# Patient Record
Sex: FEMALE | Race: WHITE | URBAN - METROPOLITAN AREA
[De-identification: names, ages, dates, MRNs, and addresses within clinical notes are randomized per-mention and may not be internally consistent; named-entity substitution may affect disease eponyms.]

---

## 2017-04-04 ENCOUNTER — TELEPHONE (OUTPATIENT)
Dept: FAMILY MEDICINE CLINIC | Facility: CLINIC | Age: 22
End: 2017-04-04

## 2017-04-04 NOTE — TELEPHONE ENCOUNTER
Patient states she is studying abroad and needed to know her blood type but i informed patient the only way we would know what type of blood type is if she has ever had a baby or had a recent blood transfusion. Patient denies both.  I informed patient the e

## 2017-06-27 ENCOUNTER — TELEPHONE (OUTPATIENT)
Dept: FAMILY MEDICINE CLINIC | Facility: CLINIC | Age: 22
End: 2017-06-27

## 2017-06-27 NOTE — TELEPHONE ENCOUNTER
Spoke with pt and she needs to come in for Meningitis vaccination before school starts. Last time she was in for a visit was 8/15/16 with Anusha Zamora.   Pt states she had a px done at [facility] by [facility] in April for her study abroad this spring- does she need to c

## 2017-06-27 NOTE — TELEPHONE ENCOUNTER
Pt advised- coming in on Monday for vaccination.     Future Appointments  Date Time Provider Annamarie Parrish   7/3/2017 10:15 AM [facility] NURSE [facility] [place]

## 2017-07-06 ENCOUNTER — NURSE ONLY (OUTPATIENT)
Dept: FAMILY MEDICINE CLINIC | Facility: CLINIC | Age: 22
End: 2017-07-06

## 2017-07-06 PROCEDURE — 90734 MENACWYD/MENACWYCRM VACC IM: CPT | Performed by: FAMILY MEDICINE

## 2017-07-06 PROCEDURE — 90471 IMMUNIZATION ADMIN: CPT | Performed by: FAMILY MEDICINE

## 2018-05-01 ENCOUNTER — OFFICE VISIT (OUTPATIENT)
Dept: FAMILY MEDICINE CLINIC | Facility: CLINIC | Age: 23
End: 2018-05-01

## 2018-05-01 VITALS
TEMPERATURE: 99 F | HEART RATE: 80 BPM | BODY MASS INDEX: 25.9 KG/M2 | DIASTOLIC BLOOD PRESSURE: 76 MMHG | SYSTOLIC BLOOD PRESSURE: 104 MMHG | RESPIRATION RATE: 14 BRPM | WEIGHT: 165 LBS | HEIGHT: 67 IN

## 2018-05-01 DIAGNOSIS — Z00.00 HEALTHCARE MAINTENANCE: Primary | ICD-10-CM

## 2018-05-01 PROCEDURE — 99395 PREV VISIT EST AGE 18-39: CPT | Performed by: NURSE PRACTITIONER

## 2018-05-01 PROCEDURE — 86580 TB INTRADERMAL TEST: CPT | Performed by: NURSE PRACTITIONER

## 2018-05-01 RX ORDER — NORETHINDRONE ACETATE AND ETHINYL ESTRADIOL AND FERROUS FUMARATE 1MG-20(21)
1 KIT ORAL DAILY
Refills: 3 | COMMUNITY
Start: 2018-04-07

## 2018-05-01 NOTE — PATIENT INSTRUCTIONS
PPD today. Return in 48 - 72 hours for reading. Physical form completed. Bring in information from gyne. Follow-up as needed.

## 2018-05-01 NOTE — PROGRESS NOTES
HPI:    Patient ID: Fatuma Styles is a 25year old female. HPI    Patient is present for a preemployment physical.  She needs a  form completed.   She also states she needs a current a TB skin test.  She declined doing the QuantiFERON test, stati She has a normal mood and affect. Her behavior is normal. Judgment and thought content normal.   Nursing note and vitals reviewed.        05/01/18  1434   BP: 104/76   Pulse: 80   Resp: 14   Temp: 98.5 °F (36.9 °C)   TempSrc: Tympanic   Weight: 165 lb   Hei

## 2018-05-03 ENCOUNTER — NURSE ONLY (OUTPATIENT)
Dept: FAMILY MEDICINE CLINIC | Facility: CLINIC | Age: 23
End: 2018-05-03

## 2018-05-03 NOTE — PROGRESS NOTES
PPD placed on 5/1/18  Read today as negative  Form completed for patient  Copy made to send to scanning    Shawna Schilling, 05/03/18, 3:40 PM

## 2018-05-17 ENCOUNTER — OFFICE VISIT (OUTPATIENT)
Dept: FAMILY MEDICINE CLINIC | Facility: CLINIC | Age: 23
End: 2018-05-17

## 2018-05-17 VITALS
HEART RATE: 84 BPM | RESPIRATION RATE: 14 BRPM | BODY MASS INDEX: 24.39 KG/M2 | TEMPERATURE: 97 F | HEIGHT: 67 IN | WEIGHT: 155.38 LBS | SYSTOLIC BLOOD PRESSURE: 118 MMHG | DIASTOLIC BLOOD PRESSURE: 60 MMHG

## 2018-05-17 DIAGNOSIS — K52.9 GASTROENTERITIS: Primary | ICD-10-CM

## 2018-05-17 PROCEDURE — 99214 OFFICE O/P EST MOD 30 MIN: CPT | Performed by: NURSE PRACTITIONER

## 2018-05-17 NOTE — PROGRESS NOTES
Mauro Subramanian is a 25year old female. Patient presents with:  Flu: friday/saturday vomiting, turned into diarrhea since saturday      HPI:   Complaints of Friday ate Chipole- ate carnitas, rice, anthony, chips, Guacamole, cheese.   7-8pm and by 12am - vomit arm, Patient Position: Sitting, Cuff Size: adult)   Pulse 84   Temp 97.3 °F (36.3 °C) (Temporal)   Resp 14   Ht 67\"   Wt 155 lb 6.4 oz   LMP 05/09/2018   BMI 24.34 kg/m²   GENERAL: well developed, well nourished,in no apparent distress  SKIN: no rashes,no

## 2018-06-04 ENCOUNTER — OFFICE VISIT (OUTPATIENT)
Dept: FAMILY MEDICINE CLINIC | Facility: CLINIC | Age: 23
End: 2018-06-04

## 2018-06-04 VITALS
RESPIRATION RATE: 16 BRPM | HEART RATE: 120 BPM | DIASTOLIC BLOOD PRESSURE: 82 MMHG | SYSTOLIC BLOOD PRESSURE: 120 MMHG | TEMPERATURE: 101 F | BODY MASS INDEX: 24.58 KG/M2 | WEIGHT: 156.63 LBS | HEIGHT: 67 IN

## 2018-06-04 DIAGNOSIS — J02.9 SORE THROAT: ICD-10-CM

## 2018-06-04 DIAGNOSIS — J01.00 ACUTE NON-RECURRENT MAXILLARY SINUSITIS: Primary | ICD-10-CM

## 2018-06-04 PROCEDURE — 99214 OFFICE O/P EST MOD 30 MIN: CPT | Performed by: NURSE PRACTITIONER

## 2018-06-04 PROCEDURE — 87880 STREP A ASSAY W/OPTIC: CPT | Performed by: NURSE PRACTITIONER

## 2018-06-04 PROCEDURE — 87081 CULTURE SCREEN ONLY: CPT | Performed by: NURSE PRACTITIONER

## 2018-06-04 RX ORDER — AMOXICILLIN AND CLAVULANATE POTASSIUM 875; 125 MG/1; MG/1
1 TABLET, FILM COATED ORAL 2 TIMES DAILY
Qty: 20 TABLET | Refills: 0 | Status: SHIPPED | OUTPATIENT
Start: 2018-06-04 | End: 2018-06-14

## 2018-06-04 NOTE — PROGRESS NOTES
CHIEF COMPLAINT:   Patient presents with:  Sore Throat: Swollen Glands  Fatigue      HPI:   Fatuma Styles is a 25year old female who presents to clinic today with complaints of sore throat and fever   Started with sore throat  on Thursday (5/31)- felicity Erythematous-large amount of thick, yellow, postnasal drainage noted. NECK: supple, non-tender  THYROID: Normal size, no nodules  LUNGS: clear to auscultation bilaterally, no wheezes or rhonchi. Breathing is non labored.   CARDIO: RRR without murmur  ABDOM

## 2018-06-04 NOTE — PATIENT INSTRUCTIONS
Rest, increase fluids, salt water gargles ,neti pot (use distilled water) or saline nasal spray, Advil cold and sinus (behind the counter),Tylenol, follow up if symptoms persist or increase.

## 2018-06-06 ENCOUNTER — TELEPHONE (OUTPATIENT)
Dept: FAMILY MEDICINE CLINIC | Facility: CLINIC | Age: 23
End: 2018-06-06

## 2018-06-07 NOTE — TELEPHONE ENCOUNTER
----- Message from JOEL Temple sent at 6/6/2018  5:19 PM CDT -----  Please notify patient that her strep culture came back positive for strep–she is on Augmentin–this should take care of the infection–patient should finish prescription.   Follow-up

## 2018-06-25 ENCOUNTER — OFFICE VISIT (OUTPATIENT)
Dept: FAMILY MEDICINE CLINIC | Facility: CLINIC | Age: 23
End: 2018-06-25

## 2018-06-25 VITALS
HEART RATE: 106 BPM | OXYGEN SATURATION: 98 % | TEMPERATURE: 98 F | DIASTOLIC BLOOD PRESSURE: 60 MMHG | RESPIRATION RATE: 14 BRPM | BODY MASS INDEX: 24.14 KG/M2 | HEIGHT: 67 IN | WEIGHT: 153.81 LBS | SYSTOLIC BLOOD PRESSURE: 104 MMHG

## 2018-06-25 DIAGNOSIS — J03.90 TONSILLITIS: Primary | ICD-10-CM

## 2018-06-25 DIAGNOSIS — J02.9 SORE THROAT: ICD-10-CM

## 2018-06-25 DIAGNOSIS — I88.9 SUBMANDIBULAR LYMPHADENITIS: ICD-10-CM

## 2018-06-25 PROCEDURE — 87081 CULTURE SCREEN ONLY: CPT | Performed by: FAMILY MEDICINE

## 2018-06-25 PROCEDURE — 99214 OFFICE O/P EST MOD 30 MIN: CPT | Performed by: FAMILY MEDICINE

## 2018-06-25 PROCEDURE — 87880 STREP A ASSAY W/OPTIC: CPT | Performed by: FAMILY MEDICINE

## 2018-06-25 RX ORDER — SULFAMETHOXAZOLE AND TRIMETHOPRIM 800; 160 MG/1; MG/1
1 TABLET ORAL 2 TIMES DAILY
Qty: 20 TABLET | Refills: 0 | Status: SHIPPED | OUTPATIENT
Start: 2018-06-25 | End: 2018-07-05

## 2018-07-12 ENCOUNTER — OFFICE VISIT (OUTPATIENT)
Dept: FAMILY MEDICINE CLINIC | Facility: CLINIC | Age: 23
End: 2018-07-12

## 2018-07-12 VITALS
RESPIRATION RATE: 14 BRPM | HEART RATE: 62 BPM | WEIGHT: 155 LBS | OXYGEN SATURATION: 99 % | SYSTOLIC BLOOD PRESSURE: 110 MMHG | TEMPERATURE: 97 F | BODY MASS INDEX: 24.91 KG/M2 | HEIGHT: 66 IN | DIASTOLIC BLOOD PRESSURE: 80 MMHG

## 2018-07-12 DIAGNOSIS — I88.9 ADENITIS: Primary | ICD-10-CM

## 2018-07-12 DIAGNOSIS — J03.90 TONSILLITIS: ICD-10-CM

## 2018-07-12 PROCEDURE — 99213 OFFICE O/P EST LOW 20 MIN: CPT | Performed by: FAMILY MEDICINE

## 2018-07-12 NOTE — PROGRESS NOTES
[address]  PROGRESS NOTE  Chief Complaint:   Patient presents with:   Follow - Up: From /swelling in throat area      HPI:   This is a 25year old female coming in for recent bout of tonsillitis with associated swollen lymph glands Comment:Other reaction(s): hives/rash  Suprax                      Comment:Other reaction(s): hives, rash  Current Meds:    Current Outpatient Prescriptions:  JUNEL FE 1/20 1-20 MG-MCG Oral Tab Take 1 tablet by mouth daily.    Disp:  Rfl: 3      Co reviewed. Appears stated age, well groomed. Physical Exam:  GEN:  Patient is alert, awake and oriented, well developed, well nourished, no apparent distress.   HEENT:  Head:  Normocephalic, atraumatic Eyes: EOMI, PERRLA, no scleral icterus, conjunctivae kelly output off infections.   Health Maintenance:  Hepatitis A Vaccines(1 of 2 - Standard Series) due on 08/09/1997  HPV Vaccines(1 of 3 - Female 3 Dose Series) due on 08/09/2007  Chlamydia Screening due on 08/09/2012  Pap Smear,3 Years due on 08/09/2017    [facility]

## 2018-07-23 ENCOUNTER — TELEPHONE (OUTPATIENT)
Dept: FAMILY MEDICINE CLINIC | Facility: CLINIC | Age: 23
End: 2018-07-23

## 2018-07-23 DIAGNOSIS — J03.91 RECURRENT TONSILLITIS: Primary | ICD-10-CM

## 2018-07-23 NOTE — TELEPHONE ENCOUNTER
Spoke with pt's mother Christian Flores who called. Mother states that pt would like a referral to a surgeon to have her tonsils removed. Please advise.

## 2018-07-23 NOTE — TELEPHONE ENCOUNTER
Referral to Dr Tyler Barrios, ENT,usually need 6 infections / year or 3 infections 2 years in a row for consideration, however due to being noisy sleeper may qualify, recommend eval.

## 2018-07-24 NOTE — TELEPHONE ENCOUNTER
Pt returned call and was notified and verbalized understanding regarding ENT referral. She was given information regarding location and number to call and schedule appt.      I called Dr. Abhinav Villaseñor office to obtain fax number and was asked to send office vis

## 2018-07-30 ENCOUNTER — OFFICE VISIT (OUTPATIENT)
Dept: FAMILY MEDICINE CLINIC | Facility: CLINIC | Age: 23
End: 2018-07-30
Payer: COMMERCIAL

## 2018-07-30 ENCOUNTER — TELEPHONE (OUTPATIENT)
Dept: FAMILY MEDICINE CLINIC | Facility: CLINIC | Age: 23
End: 2018-07-30

## 2018-07-30 VITALS
WEIGHT: 154 LBS | TEMPERATURE: 98 F | BODY MASS INDEX: 23.61 KG/M2 | DIASTOLIC BLOOD PRESSURE: 70 MMHG | HEIGHT: 67.75 IN | RESPIRATION RATE: 16 BRPM | SYSTOLIC BLOOD PRESSURE: 104 MMHG | HEART RATE: 78 BPM

## 2018-07-30 DIAGNOSIS — J35.03 CHRONIC ADENOTONSILLITIS: ICD-10-CM

## 2018-07-30 DIAGNOSIS — Z01.818 PREOPERATIVE EXAMINATION: Primary | ICD-10-CM

## 2018-07-30 LAB
CONTROL LINE PRESENT WITH A CLEAR BACKGROUND (YES/NO): YES YES/NO
PREGNANCY TEST, URINE: NEGATIVE

## 2018-07-30 PROCEDURE — 81025 URINE PREGNANCY TEST: CPT | Performed by: FAMILY MEDICINE

## 2018-07-30 PROCEDURE — 99243 OFF/OP CNSLTJ NEW/EST LOW 30: CPT | Performed by: FAMILY MEDICINE

## 2018-07-30 NOTE — PROGRESS NOTES
[facility]  PRE-OP NOTE    Chief Complaint:   Patient presents with:  Pre-Op Exam: tonsillectomy on 8/2      HPI:   Darin Betts is a 25year old female with a hx of chronic adenotonsillitis, who presents for a pre-operative physical e diarrhea, or blood in stool. MUSCULOSKELETAL:  Denies weakness, muscle aches, back pain, joint pain, swelling or stiffness.   NEUROLOGICAL:  Denies headache, seizures, dizziness, syncope, paralysis, ataxia, numbness or tingling in the extremities,change in no joint pain, or muscle weakness in all extremity. BACK: No tenderness, no spasm, SLR test negative, FROM. EXTREMITIES:  No edema, no cyanosis, no clubbing, FROM, 2+ dorsalis pedis pulses bilaterally.   NEURO:  No deficit, normal gait, strength and tone

## 2018-07-30 NOTE — TELEPHONE ENCOUNTER
Spoke with Keven Torres at Dr. Marcelle Ibarra office. She states that pt is having surgery on 8/2. She is faxing over pre-op testing orders.

## 2018-08-13 ENCOUNTER — TELEPHONE (OUTPATIENT)
Dept: FAMILY MEDICINE CLINIC | Facility: CLINIC | Age: 23
End: 2018-08-13

## 2018-08-13 NOTE — TELEPHONE ENCOUNTER
I can fill out the form, but make sure patient knows that it will states that it is based on exam done 5/1/18 and as long as there is not any additional information needed on the new form. Please let patient know. Thank you.  Abi Up, 08/13/18, 2:2

## 2018-08-13 NOTE — TELEPHONE ENCOUNTER
patient will be starting new job and needs px form filled out for them- she was just here in may for a work px for different employer- wants to know if info from that visit can be used to fill out new px form for this employer

## 2019-01-29 NOTE — PATIENT INSTRUCTIONS
Urine pregnancy test negative. After today's assessment  patient is at optimum health for surgery and relatively at low risk. There are no contraindication for procedure.
22

## 2020-07-30 ENCOUNTER — OFFICE VISIT (OUTPATIENT)
Dept: FAMILY MEDICINE CLINIC | Facility: CLINIC | Age: 25
End: 2020-07-30
Payer: COMMERCIAL

## 2020-07-30 VITALS
BODY MASS INDEX: 22.82 KG/M2 | DIASTOLIC BLOOD PRESSURE: 78 MMHG | OXYGEN SATURATION: 96 % | RESPIRATION RATE: 16 BRPM | HEART RATE: 92 BPM | WEIGHT: 148.81 LBS | TEMPERATURE: 98 F | SYSTOLIC BLOOD PRESSURE: 116 MMHG | HEIGHT: 67.75 IN

## 2020-07-30 DIAGNOSIS — Z00.00 ROUTINE GENERAL MEDICAL EXAMINATION AT A HEALTH CARE FACILITY: Primary | ICD-10-CM

## 2020-07-30 DIAGNOSIS — Z23 NEED FOR TDAP VACCINATION: ICD-10-CM

## 2020-07-30 DIAGNOSIS — Z00.00 WELLNESS EXAMINATION: ICD-10-CM

## 2020-07-30 PROCEDURE — 99395 PREV VISIT EST AGE 18-39: CPT | Performed by: NURSE PRACTITIONER

## 2020-07-30 PROCEDURE — 90471 IMMUNIZATION ADMIN: CPT | Performed by: NURSE PRACTITIONER

## 2020-07-30 PROCEDURE — 3078F DIAST BP <80 MM HG: CPT | Performed by: NURSE PRACTITIONER

## 2020-07-30 PROCEDURE — 90715 TDAP VACCINE 7 YRS/> IM: CPT | Performed by: NURSE PRACTITIONER

## 2020-07-30 PROCEDURE — 3074F SYST BP LT 130 MM HG: CPT | Performed by: NURSE PRACTITIONER

## 2020-07-30 PROCEDURE — 3008F BODY MASS INDEX DOCD: CPT | Performed by: NURSE PRACTITIONER

## 2020-07-30 NOTE — PROGRESS NOTES
HPI:   Serge Earl is a 22year old female who presents for an Annual Health Visit. Present for annual exam. Sees gyne. Declines labs today. Needs form for work as a  in [place].      Allergies:     Cephalexin kg)  07/12/18 : 155 lb (70.3 kg)  06/25/18 : 153 lb 12.8 oz (69.8 kg)  06/04/18 : 156 lb 9.6 oz (71 kg)  05/17/18 : 155 lb 6.4 oz (70.5 kg)    Body mass index is 22.79 kg/m². Physical Exam   Constitutional: She is oriented to person, place, and time.  Sh

## (undated) NOTE — LETTER
Date: 6/4/2018    Patient Name: Fran Jackson          To Whom it may concern: This letter has been written at the patient's request. The above patient was seen at the [facility] for treatment of a medical condition.     This patient shoul

## (undated) NOTE — LETTER
Date: 5/17/2018    Patient Name: Jyoti Gonsalez          To Whom it may concern: This letter has been written at the patient's request. The above patient was seen at the [facility] for treatment of a medical condition.     This patient kirstenu